# Patient Record
(demographics unavailable — no encounter records)

---

## 2024-12-30 NOTE — ASSESSMENT
[FreeTextEntry1] : 64-year-old  male presents today for initial consultation of abnormal MRI findings. Patient reports endocrinologist sent him for an US- which prompted an MRI. Patient underwent an MRI abdomen/ MRCP at [facility] on 7/8/24 results showed the following: Left adrenal gland demonstrates a 2.4 x 1.5cm nodule which demonstrates intracellular fat at inferior aspect and remainder of the nodule show no significant intracellular fat. The lesion is new since prior study. Therefore, metastasis cannot be ruled out. 3.1cm focal lesion at the right inrahilar region and 5.3 x 4cm lesion at subchondral region. Possibility of enlarged LN considered. There is also a 1.5cm density in the LLL. Further correlation with chest CT scan is rec.  Patient is known to the practice for a history of RCC in 2017- was following with Dr. Draper. S/p RIGHT radical nephrectomy 12/08/2017 for a renal cell carcinoma, clear type, 6.3 x 4.5 x 4.2 cm, occupying the midpole of the kidney. Nilson and WHO/ISUP Grade 1. Negative for lymphovascular or perineural invasion and confined within the renal capsule, even as it bulges out into the adjacent perinephric and sinus fat.  # Anemia, CKD EGFR 27  -PETCT Enlarged, hypermetabolic aortopulmonary, subcarinal, and inferior right hilar nodes, most likely representing metastatic disease. Intense focal uptake in the mid and upper sacrum, SUV max 9.3, possibly representing metastatic disease. Differential diagnosis includes malignant transformation of Paget's disease. Diffuse sclerotic changes of the right hemipelvis, most likely representing the patient's known Paget's disease.  Mildly FDG avid 2.5 cm left adrenal nodule, possibly representing metastatic disease. - biopsy 9/2024- c/w metastatic RCC - Reviewed with patient stage 4, options of treatment vs wait and watch.  Will repeat CT scan in 3 months. Patient hd interval few years between tx  - CT scan 12/2024- POD - increased adrenal mass and mediastinal adenopathy.  MRI brain - [facility] - patient offered therapy - with immunotherapy - he declines to start now.

## 2024-12-30 NOTE — HISTORY OF PRESENT ILLNESS
[Disease: _____________________] : Disease: [unfilled] [T: ___] : T[unfilled] [N: ___] : N[unfilled] [de-identified] : Patient overall feels well with no new issues. He had a CT of the chest and a MRI of the brain on Dec 16th, 2024.  [de-identified] : 64-year-old  male presents today for initial consultation of abnormal MRI findings.  Patient reports endocrinologist sent him for an US- which prompted an MRI.  Patient underwent an MRI abdomen/ MRCP at [facility] on 7/8/24 results showed the following: Left adrenal gland demonstrates a 2.4 x 1.5cm nodule which demonstrates intracellular fat at inferior aspect and remainder of the nodule show no significant intracellular fat.  The lesion is new since prior study.  Therefore, metastasis cannot be ruled out.  3.1cm focal lesion at the right inrahilar region and 5.3 x 4cm lesion at subchondral region.  Possibility of enlarged LN considered.  There is also a 1.5cm density in the LLL.  Further correlation with chest CT scan is rec.    Patient is known to the practice for a history of RCC in 2017- was following with Dr. Draper.  S/p RIGHT radical nephrectomy 12/08/2017 for a renal cell carcinoma, clear type, 6.3 x 4.5 x 4.2 cm, occupying the midpole of the kidney. Nilson and WHO/ISUP Grade 1. Negative for lymphovascular or perineural invasion and confined within the renal capsule, even as it bulges out into the adjacent perinephric and sinus fat. The tumor externally compresses but does no invade the renal pelvis and the calyces. Vascular, ureteral and soft tissue margins are all negative for tumor involvement. Benign adrenals (Tumor staging [id number] ). He was found on postoperative blood work to have persistent elevation of liver function tests (11/17 , ALT 73, 1/18  ALT 45) He was referred for MRI of abdomen which showed an abnormal bony matrix identified involving the right iliac bone extending to and involving the right acetabulum and also involving the right sacrum at the level f the right sacroiliac joint space - may represent Paget's disease. The bone scan prior to surgery showed involvement in same area.  He is s/p one dose of zometa to stabilize iliac bone and acetabulum.    He reports he has lost weight beginning 1 year ago, intentional.  Recently he started to have mid back pain- however he attributed that to exercising.  Denies cough or SOB.    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

## 2024-12-30 NOTE — HISTORY OF PRESENT ILLNESS
[Disease: _____________________] : Disease: [unfilled] [T: ___] : T[unfilled] [N: ___] : N[unfilled] [de-identified] : 64-year-old  male presents today for initial consultation of abnormal MRI findings.  Patient reports endocrinologist sent him for an US- which prompted an MRI.  Patient underwent an MRI abdomen/ MRCP at [facility] on 7/8/24 results showed the following: Left adrenal gland demonstrates a 2.4 x 1.5cm nodule which demonstrates intracellular fat at inferior aspect and remainder of the nodule show no significant intracellular fat.  The lesion is new since prior study.  Therefore, metastasis cannot be ruled out.  3.1cm focal lesion at the right inrahilar region and 5.3 x 4cm lesion at subchondral region.  Possibility of enlarged LN considered.  There is also a 1.5cm density in the LLL.  Further correlation with chest CT scan is rec.    Patient is known to the practice for a history of RCC in 2017- was following with Dr. Draper.  S/p RIGHT radical nephrectomy 12/08/2017 for a renal cell carcinoma, clear type, 6.3 x 4.5 x 4.2 cm, occupying the midpole of the kidney. Nilson and WHO/ISUP Grade 1. Negative for lymphovascular or perineural invasion and confined within the renal capsule, even as it bulges out into the adjacent perinephric and sinus fat. The tumor externally compresses but does no invade the renal pelvis and the calyces. Vascular, ureteral and soft tissue margins are all negative for tumor involvement. Benign adrenals (Tumor staging [id number] ). He was found on postoperative blood work to have persistent elevation of liver function tests (11/17 , ALT 73, 1/18  ALT 45) He was referred for MRI of abdomen which showed an abnormal bony matrix identified involving the right iliac bone extending to and involving the right acetabulum and also involving the right sacrum at the level f the right sacroiliac joint space - may represent Paget's disease. The bone scan prior to surgery showed involvement in same area.  He is s/p one dose of zometa to stabilize iliac bone and acetabulum.    He reports he has lost weight beginning 1 year ago, intentional.  Recently he started to have mid back pain- however he attributed that to exercising.  Denies cough or SOB.    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 He had a CT of the chest and a MRI of the brain on Dec 16th, 2024.

## 2024-12-30 NOTE — ASSESSMENT
[FreeTextEntry1] : 64-year-old  male presents today for initial consultation of abnormal MRI findings. Patient reports endocrinologist sent him for an US- which prompted an MRI. Patient underwent an MRI abdomen/ MRCP at [facility] on 7/8/24 results showed the following: Left adrenal gland demonstrates a 2.4 x 1.5cm nodule which demonstrates intracellular fat at inferior aspect and remainder of the nodule show no significant intracellular fat. The lesion is new since prior study. Therefore, metastasis cannot be ruled out. 3.1cm focal lesion at the right inrahilar region and 5.3 x 4cm lesion at subchondral region. Possibility of enlarged LN considered. There is also a 1.5cm density in the LLL. Further correlation with chest CT scan is rec.  Patient is known to the practice for a history of RCC in 2017- was following with Dr. Draper. S/p RIGHT radical nephrectomy 12/08/2017 for a renal cell carcinoma, clear type, 6.3 x 4.5 x 4.2 cm, occupying the midpole of the kidney. Nilson and WHO/ISUP Grade 1. Negative for lymphovascular or perineural invasion and confined within the renal capsule, even as it bulges out into the adjacent perinephric and sinus fat.  # Anemia, CKD EGFR 27  -PETCT Enlarged, hypermetabolic aortopulmonary, subcarinal, and inferior right hilar nodes, most likely representing metastatic disease. Intense focal uptake in the mid and upper sacrum, SUV max 9.3, possibly representing metastatic disease. Differential diagnosis includes malignant transformation of Paget's disease. Diffuse sclerotic changes of the right hemipelvis, most likely representing the patient's known Paget's disease.  Mildly FDG avid 2.5 cm left adrenal nodule, possibly representing metastatic disease. - biopsy 9/2024- c/w metastatic RCC - Reviewed with patient stage 4, options of treatment vs wait and watch.  Will repeat CT scan in 3 months. Patient hd interval few years between tx  - CT scan 12/2024- POD - increased adrenal mass and mediastinal adenopathy.  MRI brain - [facility] - patient offered therapy - with immunotherapy - he declines to start now.

## 2024-12-30 NOTE — HISTORY OF PRESENT ILLNESS
[Disease: _____________________] : Disease: [unfilled] [T: ___] : T[unfilled] [N: ___] : N[unfilled] [de-identified] : 64-year-old  male presents today for initial consultation of abnormal MRI findings.  Patient reports endocrinologist sent him for an US- which prompted an MRI.  Patient underwent an MRI abdomen/ MRCP at [facility] on 7/8/24 results showed the following: Left adrenal gland demonstrates a 2.4 x 1.5cm nodule which demonstrates intracellular fat at inferior aspect and remainder of the nodule show no significant intracellular fat.  The lesion is new since prior study.  Therefore, metastasis cannot be ruled out.  3.1cm focal lesion at the right inrahilar region and 5.3 x 4cm lesion at subchondral region.  Possibility of enlarged LN considered.  There is also a 1.5cm density in the LLL.  Further correlation with chest CT scan is rec.    Patient is known to the practice for a history of RCC in 2017- was following with Dr. Draper.  S/p RIGHT radical nephrectomy 12/08/2017 for a renal cell carcinoma, clear type, 6.3 x 4.5 x 4.2 cm, occupying the midpole of the kidney. Nilson and WHO/ISUP Grade 1. Negative for lymphovascular or perineural invasion and confined within the renal capsule, even as it bulges out into the adjacent perinephric and sinus fat. The tumor externally compresses but does no invade the renal pelvis and the calyces. Vascular, ureteral and soft tissue margins are all negative for tumor involvement. Benign adrenals (Tumor staging [id number] ). He was found on postoperative blood work to have persistent elevation of liver function tests (11/17 , ALT 73, 1/18  ALT 45) He was referred for MRI of abdomen which showed an abnormal bony matrix identified involving the right iliac bone extending to and involving the right acetabulum and also involving the right sacrum at the level f the right sacroiliac joint space - may represent Paget's disease. The bone scan prior to surgery showed involvement in same area.  He is s/p one dose of zometa to stabilize iliac bone and acetabulum.    He reports he has lost weight beginning 1 year ago, intentional.  Recently he started to have mid back pain- however he attributed that to exercising.  Denies cough or SOB.    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 He had a CT of the chest and a MRI of the brain on Dec 16th, 2024.

## 2024-12-30 NOTE — ASSESSMENT
[FreeTextEntry1] : 64-year-old  male presents today for initial consultation of abnormal MRI findings. Patient reports endocrinologist sent him for an US- which prompted an MRI. Patient underwent an MRI abdomen/ MRCP at [facility] on 7/8/24 results showed the following: Left adrenal gland demonstrates a 2.4 x 1.5cm nodule which demonstrates intracellular fat at inferior aspect and remainder of the nodule show no significant intracellular fat. The lesion is new since prior study. Therefore, metastasis cannot be ruled out. 3.1cm focal lesion at the right inrahilar region and 5.3 x 4cm lesion at subchondral region. Possibility of enlarged LN considered. There is also a 1.5cm density in the LLL. Further correlation with chest CT scan is rec.  Patient is known to the practice for a history of RCC in 2017- was following with Dr. Draper. S/p RIGHT radical nephrectomy 12/08/2017 for a renal cell carcinoma, clear type, 6.3 x 4.5 x 4.2 cm, occupying the midpole of the kidney. Nilson and WHO/ISUP Grade 1. Negative for lymphovascular or perineural invasion and confined within the renal capsule, even as it bulges out into the adjacent perinephric and sinus fat.  # Anemia, CKD EGFR 27  -PETCT Enlarged, hypermetabolic aortopulmonary, subcarinal, and inferior right hilar nodes, most likely representing metastatic disease. Intense focal uptake in the mid and upper sacrum, SUV max 9.3, possibly representing metastatic disease. Differential diagnosis includes malignant transformation of Paget's disease. Diffuse sclerotic changes of the right hemipelvis, most likely representing the patient's known Paget's disease.  Mildly FDG avid 2.5 cm left adrenal nodule, possibly representing metastatic disease. - biopsy 9/2024- c/w metastatic RCC - Reviewed with patient stage 4, options of treatment vs wait and watch.  Will repeat CT scan in 3 months. Patient hd interval few years between tx  - CT scan 12/2024- POD - increased adrenal mass and mediastinal adenopathy.  MRI brain - [facility] - patient offered therapy - with immunotherapy - he declines to start now.

## 2025-03-17 NOTE — HISTORY OF PRESENT ILLNESS
[Disease: _____________________] : Disease: [unfilled] [T: ___] : T[unfilled] [N: ___] : N[unfilled] [de-identified] : 64-year-old  male presents today for initial consultation of abnormal MRI findings.  Patient reports endocrinologist sent him for an US- which prompted an MRI.  Patient underwent an MRI abdomen/ MRCP at [facility] on 7/8/24 results showed the following: Left adrenal gland demonstrates a 2.4 x 1.5cm nodule which demonstrates intracellular fat at inferior aspect and remainder of the nodule show no significant intracellular fat.  The lesion is new since prior study.  Therefore, metastasis cannot be ruled out.  3.1cm focal lesion at the right inrahilar region and 5.3 x 4cm lesion at subchondral region.  Possibility of enlarged LN considered.  There is also a 1.5cm density in the LLL.  Further correlation with chest CT scan is rec.    Patient is known to the practice for a history of RCC in 2017- was following with Dr. Draper.  S/p RIGHT radical nephrectomy 12/08/2017 for a renal cell carcinoma, clear type, 6.3 x 4.5 x 4.2 cm, occupying the midpole of the kidney. Nilson and WHO/ISUP Grade 1. Negative for lymphovascular or perineural invasion and confined within the renal capsule, even as it bulges out into the adjacent perinephric and sinus fat. The tumor externally compresses but does no invade the renal pelvis and the calyces. Vascular, ureteral and soft tissue margins are all negative for tumor involvement. Benign adrenals (Tumor staging [id number] ). He was found on postoperative blood work to have persistent elevation of liver function tests (11/17 , ALT 73, 1/18  ALT 45) He was referred for MRI of abdomen which showed an abnormal bony matrix identified involving the right iliac bone extending to and involving the right acetabulum and also involving the right sacrum at the level f the right sacroiliac joint space - may represent Paget's disease. The bone scan prior to surgery showed involvement in same area.  He is s/p one dose of zometa to stabilize iliac bone and acetabulum.    He reports he has lost weight beginning 1 year ago, intentional.  Recently he started to have mid back pain- however he attributed that to exercising.  Denies cough or SOB.    Family Hx- Father with liver cancer (ETOH use) in his 60's  Social Hx- Former cigar use social occasions Denies ETOH or illicit drug use Retired [employer]- working PT [de-identified] : Patient presents today for follow up. About a month ago patient repots feeling cold and increased fatigue and weakness. If he is standing for too long he feels very weak and needs to take a break and sit down.   CT 03/2025 MRI 12/2024

## 2025-03-17 NOTE — HISTORY OF PRESENT ILLNESS
[Disease: _____________________] : Disease: [unfilled] [T: ___] : T[unfilled] [N: ___] : N[unfilled] [de-identified] : 64-year-old  male presents today for initial consultation of abnormal MRI findings.  Patient reports endocrinologist sent him for an US- which prompted an MRI.  Patient underwent an MRI abdomen/ MRCP at [facility] on 7/8/24 results showed the following: Left adrenal gland demonstrates a 2.4 x 1.5cm nodule which demonstrates intracellular fat at inferior aspect and remainder of the nodule show no significant intracellular fat.  The lesion is new since prior study.  Therefore, metastasis cannot be ruled out.  3.1cm focal lesion at the right inrahilar region and 5.3 x 4cm lesion at subchondral region.  Possibility of enlarged LN considered.  There is also a 1.5cm density in the LLL.  Further correlation with chest CT scan is rec.    Patient is known to the practice for a history of RCC in 2017- was following with Dr. Draper.  S/p RIGHT radical nephrectomy 12/08/2017 for a renal cell carcinoma, clear type, 6.3 x 4.5 x 4.2 cm, occupying the midpole of the kidney. Nilson and WHO/ISUP Grade 1. Negative for lymphovascular or perineural invasion and confined within the renal capsule, even as it bulges out into the adjacent perinephric and sinus fat. The tumor externally compresses but does no invade the renal pelvis and the calyces. Vascular, ureteral and soft tissue margins are all negative for tumor involvement. Benign adrenals (Tumor staging [id number] ). He was found on postoperative blood work to have persistent elevation of liver function tests (11/17 , ALT 73, 1/18  ALT 45) He was referred for MRI of abdomen which showed an abnormal bony matrix identified involving the right iliac bone extending to and involving the right acetabulum and also involving the right sacrum at the level f the right sacroiliac joint space - may represent Paget's disease. The bone scan prior to surgery showed involvement in same area.  He is s/p one dose of zometa to stabilize iliac bone and acetabulum.    He reports he has lost weight beginning 1 year ago, intentional.  Recently he started to have mid back pain- however he attributed that to exercising.  Denies cough or SOB.    Family Hx- Father with liver cancer (ETOH use) in his 60's  Social Hx- Former cigar use social occasions Denies ETOH or illicit drug use Retired [employer]- working PT [de-identified] : Patient presents today for follow up. About a month ago patient repots feeling cold and increased fatigue and weakness. If he is standing for too long he feels very weak and needs to take a break and sit down.   CT 03/2025 MRI 12/2024

## 2025-03-17 NOTE — BEGINNING OF VISIT
[0] : 2) Feeling down, depressed, or hopeless: Not at all (0) [PHQ-2 Negative] : PHQ-2 Negative [Pain Scale: ___] : On a scale of 1-10, today the patient's pain is a(n) [unfilled]. [Former] : Former [> 15 Years] : > 15 Years [Date Discussed (MM/DD/YY): ___] : Discussed: [unfilled] [Reviewed, no changes] : Reviewed, no changes [[id number]] : 0 [de-identified] : quit over 40 years ago

## 2025-03-17 NOTE — ASSESSMENT
[FreeTextEntry1] : 64-year-old  male presents today for initial consultation of abnormal MRI findings. Patient reports endocrinologist sent him for an US- which prompted an MRI. Patient underwent an MRI abdomen/ MRCP at [facility] on 7/8/24 results showed the following: Left adrenal gland demonstrates a 2.4 x 1.5cm nodule which demonstrates intracellular fat at inferior aspect and remainder of the nodule show no significant intracellular fat. The lesion is new since prior study. Therefore, metastasis cannot be ruled out. 3.1cm focal lesion at the right inrahilar region and 5.3 x 4cm lesion at subchondral region. Possibility of enlarged LN considered. There is also a 1.5cm density in the LLL. Further correlation with chest CT scan is rec.  Patient is known to the practice for a history of RCC in 2017- was following with Dr. Draper. S/p RIGHT radical nephrectomy 12/08/2017 for a renal cell carcinoma, clear type, 6.3 x 4.5 x 4.2 cm, occupying the midpole of the kidney. Nilson and WHO/ISUP Grade 1. Negative for lymphovascular or perineural invasion and confined within the renal capsule, even as it bulges out into the adjacent perinephric and sinus fat.  # Anemia, CKD EGFR 27  -PETCT Enlarged, hypermetabolic aortopulmonary, subcarinal, and inferior right hilar nodes, most likely representing metastatic disease. Intense focal uptake in the mid and upper sacrum, SUV max 9.3, possibly representing metastatic disease. Differential diagnosis includes malignant transformation of Paget's disease. Diffuse sclerotic changes of the right hemipelvis, most likely representing the patient's known Paget's disease.  Mildly FDG avid 2.5 cm left adrenal nodule, possibly representing metastatic disease. - biopsy 9/2024- c/w metastatic RCC - Reviewed with patient stage 4, options of treatment vs wait and watch.  Will repeat CT scan in 3 months. Patient hd interval few years between tx  - CT scan 12/2024- POD - increased adrenal mass and mediastinal adenopathy.  MRI brain - [facility] - patient offered therapy - with immunotherapy - he declines to start now.  - CT scan 3/25- stable adenopathy, images reviewed with patient  - PETCT 6/25  # Hypotension - patient on Monjaro - lost 13 kg, decline in BP while on Losartan.  Stop losartan, IV fluids, check cortisol

## 2025-03-17 NOTE — REVIEW OF SYSTEMS
[Diarrhea: Grade 0] : Diarrhea: Grade 0 [Negative] : Allergic/Immunologic [Patient Intake Form Reviewed] : Patient intake form was reviewed [Fatigue] : fatigue

## 2025-03-17 NOTE — BEGINNING OF VISIT
[0] : 2) Feeling down, depressed, or hopeless: Not at all (0) [PHQ-2 Negative] : PHQ-2 Negative [Pain Scale: ___] : On a scale of 1-10, today the patient's pain is a(n) [unfilled]. [Former] : Former [> 15 Years] : > 15 Years [Date Discussed (MM/DD/YY): ___] : Discussed: [unfilled] [Reviewed, no changes] : Reviewed, no changes [[id number]] : 0 [de-identified] : quit over 40 years ago

## 2025-03-17 NOTE — HISTORY OF PRESENT ILLNESS
[Disease: _____________________] : Disease: [unfilled] [T: ___] : T[unfilled] [N: ___] : N[unfilled] [de-identified] : 64-year-old  male presents today for initial consultation of abnormal MRI findings.  Patient reports endocrinologist sent him for an US- which prompted an MRI.  Patient underwent an MRI abdomen/ MRCP at [facility] on 7/8/24 results showed the following: Left adrenal gland demonstrates a 2.4 x 1.5cm nodule which demonstrates intracellular fat at inferior aspect and remainder of the nodule show no significant intracellular fat.  The lesion is new since prior study.  Therefore, metastasis cannot be ruled out.  3.1cm focal lesion at the right inrahilar region and 5.3 x 4cm lesion at subchondral region.  Possibility of enlarged LN considered.  There is also a 1.5cm density in the LLL.  Further correlation with chest CT scan is rec.    Patient is known to the practice for a history of RCC in 2017- was following with Dr. Draper.  S/p RIGHT radical nephrectomy 12/08/2017 for a renal cell carcinoma, clear type, 6.3 x 4.5 x 4.2 cm, occupying the midpole of the kidney. Nilson and WHO/ISUP Grade 1. Negative for lymphovascular or perineural invasion and confined within the renal capsule, even as it bulges out into the adjacent perinephric and sinus fat. The tumor externally compresses but does no invade the renal pelvis and the calyces. Vascular, ureteral and soft tissue margins are all negative for tumor involvement. Benign adrenals (Tumor staging [id number] ). He was found on postoperative blood work to have persistent elevation of liver function tests (11/17 , ALT 73, 1/18  ALT 45) He was referred for MRI of abdomen which showed an abnormal bony matrix identified involving the right iliac bone extending to and involving the right acetabulum and also involving the right sacrum at the level f the right sacroiliac joint space - may represent Paget's disease. The bone scan prior to surgery showed involvement in same area.  He is s/p one dose of zometa to stabilize iliac bone and acetabulum.    He reports he has lost weight beginning 1 year ago, intentional.  Recently he started to have mid back pain- however he attributed that to exercising.  Denies cough or SOB.    Family Hx- Father with liver cancer (ETOH use) in his 60's  Social Hx- Former cigar use social occasions Denies ETOH or illicit drug use Retired [employer]- working PT [de-identified] : Patient presents today for follow up. About a month ago patient repots feeling cold and increased fatigue and weakness. If he is standing for too long he feels very weak and needs to take a break and sit down.   CT 03/2025 MRI 12/2024

## 2025-03-17 NOTE — BEGINNING OF VISIT
[0] : 2) Feeling down, depressed, or hopeless: Not at all (0) [PHQ-2 Negative] : PHQ-2 Negative [Pain Scale: ___] : On a scale of 1-10, today the patient's pain is a(n) [unfilled]. [Former] : Former [> 15 Years] : > 15 Years [Date Discussed (MM/DD/YY): ___] : Discussed: [unfilled] [Reviewed, no changes] : Reviewed, no changes [[id number]] : 0 [de-identified] : quit over 40 years ago

## 2025-03-17 NOTE — HISTORY OF PRESENT ILLNESS
[Disease: _____________________] : Disease: [unfilled] [T: ___] : T[unfilled] [N: ___] : N[unfilled] [de-identified] : 64-year-old  male presents today for initial consultation of abnormal MRI findings.  Patient reports endocrinologist sent him for an US- which prompted an MRI.  Patient underwent an MRI abdomen/ MRCP at [facility] on 7/8/24 results showed the following: Left adrenal gland demonstrates a 2.4 x 1.5cm nodule which demonstrates intracellular fat at inferior aspect and remainder of the nodule show no significant intracellular fat.  The lesion is new since prior study.  Therefore, metastasis cannot be ruled out.  3.1cm focal lesion at the right inrahilar region and 5.3 x 4cm lesion at subchondral region.  Possibility of enlarged LN considered.  There is also a 1.5cm density in the LLL.  Further correlation with chest CT scan is rec.    Patient is known to the practice for a history of RCC in 2017- was following with Dr. Draper.  S/p RIGHT radical nephrectomy 12/08/2017 for a renal cell carcinoma, clear type, 6.3 x 4.5 x 4.2 cm, occupying the midpole of the kidney. Nilson and WHO/ISUP Grade 1. Negative for lymphovascular or perineural invasion and confined within the renal capsule, even as it bulges out into the adjacent perinephric and sinus fat. The tumor externally compresses but does no invade the renal pelvis and the calyces. Vascular, ureteral and soft tissue margins are all negative for tumor involvement. Benign adrenals (Tumor staging [id number] ). He was found on postoperative blood work to have persistent elevation of liver function tests (11/17 , ALT 73, 1/18  ALT 45) He was referred for MRI of abdomen which showed an abnormal bony matrix identified involving the right iliac bone extending to and involving the right acetabulum and also involving the right sacrum at the level f the right sacroiliac joint space - may represent Paget's disease. The bone scan prior to surgery showed involvement in same area.  He is s/p one dose of zometa to stabilize iliac bone and acetabulum.    He reports he has lost weight beginning 1 year ago, intentional.  Recently he started to have mid back pain- however he attributed that to exercising.  Denies cough or SOB.    Family Hx- Father with liver cancer (ETOH use) in his 60's  Social Hx- Former cigar use social occasions Denies ETOH or illicit drug use Retired [employer]- working PT [de-identified] : Patient presents today for follow up. About a month ago patient repots feeling cold and increased fatigue and weakness. If he is standing for too long he feels very weak and needs to take a break and sit down.   CT 03/2025 MRI 12/2024

## 2025-03-17 NOTE — REASON FOR VISIT
No significant past surgical history [Follow-Up Visit] : a follow-up [Spouse] : spouse [FreeTextEntry2] : adenopathy

## 2025-03-17 NOTE — BEGINNING OF VISIT
[0] : 2) Feeling down, depressed, or hopeless: Not at all (0) [PHQ-2 Negative] : PHQ-2 Negative [Pain Scale: ___] : On a scale of 1-10, today the patient's pain is a(n) [unfilled]. [Former] : Former [> 15 Years] : > 15 Years [Date Discussed (MM/DD/YY): ___] : Discussed: [unfilled] [Reviewed, no changes] : Reviewed, no changes [[id number]] : 0 [de-identified] : quit over 40 years ago

## 2025-03-17 NOTE — ASSESSMENT
[FreeTextEntry1] : 64-year-old  male presents today for initial consultation of abnormal MRI findings. Patient reports endocrinologist sent him for an US- which prompted an MRI. Patient underwent an MRI abdomen/ MRCP at [facility] on 7/8/24 results showed the following: Left adrenal gland demonstrates a 2.4 x 1.5cm nodule which demonstrates intracellular fat at inferior aspect and remainder of the nodule show no significant intracellular fat. The lesion is new since prior study. Therefore, metastasis cannot be ruled out. 3.1cm focal lesion at the right inrahilar region and 5.3 x 4cm lesion at subchondral region. Possibility of enlarged LN considered. There is also a 1.5cm density in the LLL. Further correlation with chest CT scan is rec.  Patient is known to the practice for a history of RCC in 2017- was following with Dr. Draper. S/p RIGHT radical nephrectomy 12/08/2017 for a renal cell carcinoma, clear type, 6.3 x 4.5 x 4.2 cm, occupying the midpole of the kidney. Nilson and WHO/ISUP Grade 1. Negative for lymphovascular or perineural invasion and confined within the renal capsule, even as it bulges out into the adjacent perinephric and sinus fat.  # Anemia, CKD EGFR 27  -PETCT Enlarged, hypermetabolic aortopulmonary, subcarinal, and inferior right hilar nodes, most likely representing metastatic disease. Intense focal uptake in the mid and upper sacrum, SUV max 9.3, possibly representing metastatic disease. Differential diagnosis includes malignant transformation of Paget's disease. Diffuse sclerotic changes of the right hemipelvis, most likely representing the patient's known Paget's disease.  Mildly FDG avid 2.5 cm left adrenal nodule, possibly representing metastatic disease. - biopsy 9/2024- c/w metastatic RCC - Reviewed with patient stage 4, options of treatment vs wait and watch.  Will repeat CT scan in 3 months. Patient hd interval few years between tx  - CT scan 12/2024- POD - increased adrenal mass and mediastinal adenopathy.  MRI brain - [facility] - patient offered therapy - with immunotherapy - he declines to start now.  - CT scan 3/25- stable adenopathy, images reviewed with patient  - PETCT 6/25  # Hypotension - patient on Monjaro - lost 13 kg, decline in BP while on Losartan.  Stop losartan, IV fluids, check cortisol

## 2025-03-17 NOTE — ASSESSMENT
[FreeTextEntry1] : 64-year-old  male presents today for initial consultation of abnormal MRI findings. Patient reports endocrinologist sent him for an US- which prompted an MRI. Patient underwent an MRI abdomen/ MRCP at [facility] on 7/8/24 results showed the following: Left adrenal gland demonstrates a 2.4 x 1.5cm nodule which demonstrates intracellular fat at inferior aspect and remainder of the nodule show no significant intracellular fat. The lesion is new since prior study. Therefore, metastasis cannot be ruled out. 3.1cm focal lesion at the right inrahilar region and 5.3 x 4cm lesion at subchondral region. Possibility of enlarged LN considered. There is also a 1.5cm density in the LLL. Further correlation with chest CT scan is rec.  Patient is known to the practice for a history of RCC in 2017- was following with Dr. Draper. S/p RIGHT radical nephrectomy 12/08/2017 for a renal cell carcinoma, clear type, 6.3 x 4.5 x 4.2 cm, occupying the midpole of the kidney. Nilson and WHO/ISUP Grade 1. Negative for lymphovascular or perineural invasion and confined within the renal capsule, even as it bulges out into the adjacent perinephric and sinus fat.  # Anemia, CKD EGFR 27  -PETCT Enlarged, hypermetabolic aortopulmonary, subcarinal, and inferior right hilar nodes, most likely representing metastatic disease. Intense focal uptake in the mid and upper sacrum, SUV max 9.3, possibly representing metastatic disease. Differential diagnosis includes malignant transformation of Paget's disease. Diffuse sclerotic changes of the right hemipelvis, most likely representing the patient's known Paget's disease.  Mildly FDG avid 2.5 cm left adrenal nodule, possibly representing metastatic disease. - biopsy 9/2024- c/w metastatic RCC - Reviewed with patient stage 4, options of treatment vs wait and watch.  Will repeat CT scan in 3 months. Patient hd interval few years between tx  - CT scan 12/2024- POD - increased adrenal mass and mediastinal adenopathy.  MRI brain - [facility] - patient offered therapy - with immunotherapy - he declines to start now.  - CT scan 3/25- stable adenopathy, images reviewed with patient  - PETCT 6/25  # Hypotension - patient on Monjaro - lost 13 kg, decline in BP while on Losartan.  Stop losartan, IV fluids, check cortisol

## 2025-03-17 NOTE — BEGINNING OF VISIT
[0] : 2) Feeling down, depressed, or hopeless: Not at all (0) [PHQ-2 Negative] : PHQ-2 Negative [Pain Scale: ___] : On a scale of 1-10, today the patient's pain is a(n) [unfilled]. [Former] : Former [> 15 Years] : > 15 Years [Date Discussed (MM/DD/YY): ___] : Discussed: [unfilled] [Reviewed, no changes] : Reviewed, no changes [AAC4Hphib] : 0 [de-identified] : quit over 40 years ago

## 2025-03-17 NOTE — HISTORY OF PRESENT ILLNESS
[Disease: _____________________] : Disease: [unfilled] [T: ___] : T[unfilled] [N: ___] : N[unfilled] [de-identified] : 64-year-old  male presents today for initial consultation of abnormal MRI findings.  Patient reports endocrinologist sent him for an US- which prompted an MRI.  Patient underwent an MRI abdomen/ MRCP at [facility] on 7/8/24 results showed the following: Left adrenal gland demonstrates a 2.4 x 1.5cm nodule which demonstrates intracellular fat at inferior aspect and remainder of the nodule show no significant intracellular fat.  The lesion is new since prior study.  Therefore, metastasis cannot be ruled out.  3.1cm focal lesion at the right inrahilar region and 5.3 x 4cm lesion at subchondral region.  Possibility of enlarged LN considered.  There is also a 1.5cm density in the LLL.  Further correlation with chest CT scan is rec.    Patient is known to the practice for a history of RCC in 2017- was following with Dr. Draper.  S/p RIGHT radical nephrectomy 12/08/2017 for a renal cell carcinoma, clear type, 6.3 x 4.5 x 4.2 cm, occupying the midpole of the kidney. Nilson and WHO/ISUP Grade 1. Negative for lymphovascular or perineural invasion and confined within the renal capsule, even as it bulges out into the adjacent perinephric and sinus fat. The tumor externally compresses but does no invade the renal pelvis and the calyces. Vascular, ureteral and soft tissue margins are all negative for tumor involvement. Benign adrenals (Tumor staging [id number] ). He was found on postoperative blood work to have persistent elevation of liver function tests (11/17 , ALT 73, 1/18  ALT 45) He was referred for MRI of abdomen which showed an abnormal bony matrix identified involving the right iliac bone extending to and involving the right acetabulum and also involving the right sacrum at the level f the right sacroiliac joint space - may represent Paget's disease. The bone scan prior to surgery showed involvement in same area.  He is s/p one dose of zometa to stabilize iliac bone and acetabulum.    He reports he has lost weight beginning 1 year ago, intentional.  Recently he started to have mid back pain- however he attributed that to exercising.  Denies cough or SOB.    Family Hx- Father with liver cancer (ETOH use) in his 60's  Social Hx- Former cigar use social occasions Denies ETOH or illicit drug use Retired [employer]- working PT [de-identified] : Patient presents today for follow up. About a month ago patient repots feeling cold and increased fatigue and weakness. If he is standing for too long he feels very weak and needs to take a break and sit down.   CT 03/2025 MRI 12/2024

## 2025-03-17 NOTE — ASSESSMENT
[FreeTextEntry1] : 64-year-old  male presents today for initial consultation of abnormal MRI findings. Patient reports endocrinologist sent him for an US- which prompted an MRI. Patient underwent an MRI abdomen/ MRCP at [facility] on 7/8/24 results showed the following: Left adrenal gland demonstrates a 2.4 x 1.5cm nodule which demonstrates intracellular fat at inferior aspect and remainder of the nodule show no significant intracellular fat. The lesion is new since prior study. Therefore, metastasis cannot be ruled out. 3.1cm focal lesion at the right inrahilar region and 5.3 x 4cm lesion at subchondral region. Possibility of enlarged LN considered. There is also a 1.5cm density in the LLL. Further correlation with chest CT scan is rec.  Patient is known to the practice for a history of RCC in 2017- was following with Dr. Draper. S/p RIGHT radical nephrectomy 12/08/2017 for a renal cell carcinoma, clear type, 6.3 x 4.5 x 4.2 cm, occupying the midpole of the kidney. Nilson and WHO/ISUP Grade 1. Negative for lymphovascular or perineural invasion and confined within the renal capsule, even as it bulges out into the adjacent perinephric and sinus fat.  # Anemia, CKD EGFR 27  -PETCT Enlarged, hypermetabolic aortopulmonary, subcarinal, and inferior right hilar nodes, most likely representing metastatic disease. Intense focal uptake in the mid and upper sacrum, SUV max 9.3, possibly representing metastatic disease. Differential diagnosis includes malignant transformation of Paget's disease. Diffuse sclerotic changes of the right hemipelvis, most likely representing the patient's known Paget's disease.  Mildly FDG avid 2.5 cm left adrenal nodule, possibly representing metastatic disease. - biopsy 9/2024- c/w metastatic RCC - Reviewed with patient stage 4, options of treatment vs wait and watch.  Will repeat CT scan in 3 months. Patient hd interval few years between tx  - CT scan 12/2024- POD - increased adrenal mass and mediastinal adenopathy.  MRI brain - [facility] - patient offered therapy - with immunotherapy - he declines to start now.  - CT scan 3/25- stable adenopathy, images reviewed with patient  - PETCT 6/25  # Hypotension - patient on Monjaro - lost 13 kg, decline in BP while on Losartan.  Stop losartan, IV fluids, check cortisol

## 2025-03-17 NOTE — ASSESSMENT
[FreeTextEntry1] : 64-year-old  male presents today for initial consultation of abnormal MRI findings. Patient reports endocrinologist sent him for an US- which prompted an MRI. Patient underwent an MRI abdomen/ MRCP at [facility] on 7/8/24 results showed the following: Left adrenal gland demonstrates a 2.4 x 1.5cm nodule which demonstrates intracellular fat at inferior aspect and remainder of the nodule show no significant intracellular fat. The lesion is new since prior study. Therefore, metastasis cannot be ruled out. 3.1cm focal lesion at the right inrahilar region and 5.3 x 4cm lesion at subchondral region. Possibility of enlarged LN considered. There is also a 1.5cm density in the LLL. Further correlation with chest CT scan is rec.  Patient is known to the practice for a history of RCC in 2017- was following with Dr. Draper. S/p RIGHT radical nephrectomy 12/08/2017 for a renal cell carcinoma, clear type, 6.3 x 4.5 x 4.2 cm, occupying the midpole of the kidney. Nilson and WHO/ISUP Grade 1. Negative for lymphovascular or perineural invasion and confined within the renal capsule, even as it bulges out into the adjacent perinephric and sinus fat.  # Anemia, CKD EGFR 27  -PETCT Enlarged, hypermetabolic aortopulmonary, subcarinal, and inferior right hilar nodes, most likely representing metastatic disease. Intense focal uptake in the mid and upper sacrum, SUV max 9.3, possibly representing metastatic disease. Differential diagnosis includes malignant transformation of Paget's disease. Diffuse sclerotic changes of the right hemipelvis, most likely representing the patient's known Paget's disease.  Mildly FDG avid 2.5 cm left adrenal nodule, possibly representing metastatic disease. - biopsy 9/2024- c/w metastatic RCC - Reviewed with patient stage 4, options of treatment vs wait and watch.  Will repeat CT scan in 3 months. Patient hd interval few years between tx  - CT scan 12/2024- POD - increased adrenal mass and mediastinal adenopathy.  MRI brain - [facility] - patient offered therapy - with immunotherapy - he declines to start now.  - CT scan 3/25- stable adenopathy, images reviewed with patient  - PETCT 6/25  # Hypotension - patient on Monjaro - lost 13 kg, decline in BP while on Losartan.  Stop losartan, IV fluids, check cortisol

## 2025-06-19 NOTE — HISTORY OF PRESENT ILLNESS
[Disease: _____________________] : Disease: [unfilled] [T: ___] : T[unfilled] [N: ___] : N[unfilled] [de-identified] : 64-year-old  male presents today for initial consultation of abnormal MRI findings.  Patient reports endocrinologist sent him for an US- which prompted an MRI.  Patient underwent an MRI abdomen/ MRCP at [facility] on 7/8/24 results showed the following: Left adrenal gland demonstrates a 2.4 x 1.5cm nodule which demonstrates intracellular fat at inferior aspect and remainder of the nodule show no significant intracellular fat.  The lesion is new since prior study.  Therefore, metastasis cannot be ruled out.  3.1cm focal lesion at the right inrahilar region and 5.3 x 4cm lesion at subchondral region.  Possibility of enlarged LN considered.  There is also a 1.5cm density in the LLL.  Further correlation with chest CT scan is rec.    Patient is known to the practice for a history of RCC in 2017- was following with Dr. Draper.  S/p RIGHT radical nephrectomy 12/08/2017 for a renal cell carcinoma, clear type, 6.3 x 4.5 x 4.2 cm, occupying the midpole of the kidney. Nilson and WHO/ISUP Grade 1. Negative for lymphovascular or perineural invasion and confined within the renal capsule, even as it bulges out into the adjacent perinephric and sinus fat. The tumor externally compresses but does no invade the renal pelvis and the calyces. Vascular, ureteral and soft tissue margins are all negative for tumor involvement. Benign adrenals (Tumor staging [id number] ). He was found on postoperative blood work to have persistent elevation of liver function tests (11/17 , ALT 73, 1/18  ALT 45) He was referred for MRI of abdomen which showed an abnormal bony matrix identified involving the right iliac bone extending to and involving the right acetabulum and also involving the right sacrum at the level f the right sacroiliac joint space - may represent Paget's disease. The bone scan prior to surgery showed involvement in same area.  He is s/p one dose of zometa to stabilize iliac bone and acetabulum.    He reports he has lost weight beginning 1 year ago, intentional.  Recently he started to have mid back pain- however he attributed that to exercising.  Denies cough or SOB.    Family Hx- Father with liver cancer (ETOH use) in his 60's  Social Hx- Former cigar use social occasions Denies ETOH or illicit drug use Retired [employer]- working PT [de-identified] : Patient presents today for follow up. Patient overall feels well with no new issues. He has a PET scan order but hasn't heard back from radiology yet to schedule.

## 2025-06-19 NOTE — ASSESSMENT
[FreeTextEntry1] : 64-year-old  male presents today for initial consultation of abnormal MRI findings. Patient reports endocrinologist sent him for an US- which prompted an MRI. Patient underwent an MRI abdomen/ MRCP at [facility] on 7/8/24 results showed the following: Left adrenal gland demonstrates a 2.4 x 1.5cm nodule which demonstrates intracellular fat at inferior aspect and remainder of the nodule show no significant intracellular fat. The lesion is new since prior study. Therefore, metastasis cannot be ruled out. 3.1cm focal lesion at the right inrahilar region and 5.3 x 4cm lesion at subchondral region. Possibility of enlarged LN considered. There is also a 1.5cm density in the LLL. Further correlation with chest CT scan is rec.  Patient is known to the practice for a history of RCC in 2017- was following with Dr. Draper. S/p RIGHT radical nephrectomy 12/08/2017 for a renal cell carcinoma, clear type, 6.3 x 4.5 x 4.2 cm, occupying the midpole of the kidney. Nilson and WHO/ISUP Grade 1. Negative for lymphovascular or perineural invasion and confined within the renal capsule, even as it bulges out into the adjacent perinephric and sinus fat.  # Anemia, CKD EGFR 27  -PETCT Enlarged, hypermetabolic aortopulmonary, subcarinal, and inferior right hilar nodes, most likely representing metastatic disease. Intense focal uptake in the mid and upper sacrum, SUV max 9.3, possibly representing metastatic disease. Differential diagnosis includes malignant transformation of Paget's disease. Diffuse sclerotic changes of the right hemipelvis, most likely representing the patient's known Paget's disease.  Mildly FDG avid 2.5 cm left adrenal nodule, possibly representing metastatic disease. - biopsy 9/2024- c/w metastatic RCC - Reviewed with patient stage 4, options of treatment vs wait and watch.  Will repeat CT scan in 3 months. Patient hd interval few years between tx  - CT scan 12/2024- POD - increased adrenal mass and mediastinal adenopathy.  MRI brain - [facility] - patient offered therapy - with immunotherapy - he declines to start now.  - CT scan 3/25- stable adenopathy, images reviewed with patient  - PETCT 6/25- due, patient didnt do yet   # Hypotension - patient on Monjaro - lost 13 kg, decline in BP while on Losartan.  Stop losartan, IV fluids, check cortisol  # Weight lost - reviewed with patient, considering tapering Monjaro

## 2025-06-19 NOTE — HISTORY OF PRESENT ILLNESS
[Disease: _____________________] : Disease: [unfilled] [T: ___] : T[unfilled] [N: ___] : N[unfilled] [de-identified] : 64-year-old  male presents today for initial consultation of abnormal MRI findings.  Patient reports endocrinologist sent him for an US- which prompted an MRI.  Patient underwent an MRI abdomen/ MRCP at [facility] on 7/8/24 results showed the following: Left adrenal gland demonstrates a 2.4 x 1.5cm nodule which demonstrates intracellular fat at inferior aspect and remainder of the nodule show no significant intracellular fat.  The lesion is new since prior study.  Therefore, metastasis cannot be ruled out.  3.1cm focal lesion at the right inrahilar region and 5.3 x 4cm lesion at subchondral region.  Possibility of enlarged LN considered.  There is also a 1.5cm density in the LLL.  Further correlation with chest CT scan is rec.    Patient is known to the practice for a history of RCC in 2017- was following with Dr. Draper.  S/p RIGHT radical nephrectomy 12/08/2017 for a renal cell carcinoma, clear type, 6.3 x 4.5 x 4.2 cm, occupying the midpole of the kidney. Nilson and WHO/ISUP Grade 1. Negative for lymphovascular or perineural invasion and confined within the renal capsule, even as it bulges out into the adjacent perinephric and sinus fat. The tumor externally compresses but does no invade the renal pelvis and the calyces. Vascular, ureteral and soft tissue margins are all negative for tumor involvement. Benign adrenals (Tumor staging [id number] ). He was found on postoperative blood work to have persistent elevation of liver function tests (11/17 , ALT 73, 1/18  ALT 45) He was referred for MRI of abdomen which showed an abnormal bony matrix identified involving the right iliac bone extending to and involving the right acetabulum and also involving the right sacrum at the level f the right sacroiliac joint space - may represent Paget's disease. The bone scan prior to surgery showed involvement in same area.  He is s/p one dose of zometa to stabilize iliac bone and acetabulum.    He reports he has lost weight beginning 1 year ago, intentional.  Recently he started to have mid back pain- however he attributed that to exercising.  Denies cough or SOB.    Family Hx- Father with liver cancer (ETOH use) in his 60's  Social Hx- Former cigar use social occasions Denies ETOH or illicit drug use Retired [employer]- working PT [de-identified] : Patient presents today for follow up. Patient overall feels well with no new issues. He has a PET scan order but hasn't heard back from radiology yet to schedule.

## 2025-06-19 NOTE — ASSESSMENT
[FreeTextEntry1] : 64-year-old  male presents today for initial consultation of abnormal MRI findings. Patient reports endocrinologist sent him for an US- which prompted an MRI. Patient underwent an MRI abdomen/ MRCP at [facility] on 7/8/24 results showed the following: Left adrenal gland demonstrates a 2.4 x 1.5cm nodule which demonstrates intracellular fat at inferior aspect and remainder of the nodule show no significant intracellular fat. The lesion is new since prior study. Therefore, metastasis cannot be ruled out. 3.1cm focal lesion at the right inrahilar region and 5.3 x 4cm lesion at subchondral region. Possibility of enlarged LN considered. There is also a 1.5cm density in the LLL. Further correlation with chest CT scan is rec.  Patient is known to the practice for a history of RCC in 2017- was following with Dr. Draper. S/p RIGHT radical nephrectomy 12/08/2017 for a renal cell carcinoma, clear type, 6.3 x 4.5 x 4.2 cm, occupying the midpole of the kidney. Nilson and WHO/ISUP Grade 1. Negative for lymphovascular or perineural invasion and confined within the renal capsule, even as it bulges out into the adjacent perinephric and sinus fat.  # Anemia, CKD EGFR 27  -PETCT Enlarged, hypermetabolic aortopulmonary, subcarinal, and inferior right hilar nodes, most likely representing metastatic disease. Intense focal uptake in the mid and upper sacrum, SUV max 9.3, possibly representing metastatic disease. Differential diagnosis includes malignant transformation of Paget's disease. Diffuse sclerotic changes of the right hemipelvis, most likely representing the patient's known Paget's disease.  Mildly FDG avid 2.5 cm left adrenal nodule, possibly representing metastatic disease. - biopsy 9/2024- c/w metastatic RCC - Reviewed with patient stage 4, options of treatment vs wait and watch.  Will repeat CT scan in 3 months. Patient hd interval few years between tx  - CT scan 12/2024- POD - increased adrenal mass and mediastinal adenopathy.  MRI brain - [facility] - patient offered therapy - with immunotherapy - he declines to start now.  - CT scan 3/25- stable adenopathy, images reviewed with patient  - PETCT 6/25- due, patient didnt do yet   # Hypotension - patient on Monjaro - lost 13 kg, decline in BP while on Losartan.  Stop losartan, IV fluids, check cortisol  # Weight lost - reviewed with patient, considering tapering Monjaro

## 2025-06-19 NOTE — ASSESSMENT
[FreeTextEntry1] : 64-year-old  male presents today for initial consultation of abnormal MRI findings. Patient reports endocrinologist sent him for an US- which prompted an MRI. Patient underwent an MRI abdomen/ MRCP at [facility] on 7/8/24 results showed the following: Left adrenal gland demonstrates a 2.4 x 1.5cm nodule which demonstrates intracellular fat at inferior aspect and remainder of the nodule show no significant intracellular fat. The lesion is new since prior study. Therefore, metastasis cannot be ruled out. 3.1cm focal lesion at the right inrahilar region and 5.3 x 4cm lesion at subchondral region. Possibility of enlarged LN considered. There is also a 1.5cm density in the LLL. Further correlation with chest CT scan is rec.  Patient is known to the practice for a history of RCC in 2017- was following with Dr. Draper. S/p RIGHT radical nephrectomy 12/08/2017 for a renal cell carcinoma, clear type, 6.3 x 4.5 x 4.2 cm, occupying the midpole of the kidney. Nilson and WHO/ISUP Grade 1. Negative for lymphovascular or perineural invasion and confined within the renal capsule, even as it bulges out into the adjacent perinephric and sinus fat.  # Anemia, CKD EGFR 27  -PETCT Enlarged, hypermetabolic aortopulmonary, subcarinal, and inferior right hilar nodes, most likely representing metastatic disease. Intense focal uptake in the mid and upper sacrum, SUV max 9.3, possibly representing metastatic disease. Differential diagnosis includes malignant transformation of Paget's disease. Diffuse sclerotic changes of the right hemipelvis, most likely representing the patient's known Paget's disease.  Mildly FDG avid 2.5 cm left adrenal nodule, possibly representing metastatic disease. - biopsy 9/2024- c/w metastatic RCC - Reviewed with patient stage 4, options of treatment vs wait and watch.  Will repeat CT scan in 3 months. Patient hd interval few years between tx  - CT scan 12/2024- POD - increased adrenal mass and mediastinal adenopathy.  MRI brain - [facility] - patient offered therapy - with immunotherapy - he declines to start now.  - CT scan 3/25- stable adenopathy, images reviewed with patient  - PETCT 6/25- due, patient didnt do yet   # Hypotension - patient on Monjaro - lost 13 kg, decline in BP while on Losartan.  Stop losartan, IV fluids, check cortisol  # Weight lost - reviewed with patient, considering tapering Monjaro

## 2025-06-19 NOTE — HISTORY OF PRESENT ILLNESS
[Disease: _____________________] : Disease: [unfilled] [T: ___] : T[unfilled] [N: ___] : N[unfilled] [de-identified] : 64-year-old  male presents today for initial consultation of abnormal MRI findings.  Patient reports endocrinologist sent him for an US- which prompted an MRI.  Patient underwent an MRI abdomen/ MRCP at [facility] on 7/8/24 results showed the following: Left adrenal gland demonstrates a 2.4 x 1.5cm nodule which demonstrates intracellular fat at inferior aspect and remainder of the nodule show no significant intracellular fat.  The lesion is new since prior study.  Therefore, metastasis cannot be ruled out.  3.1cm focal lesion at the right inrahilar region and 5.3 x 4cm lesion at subchondral region.  Possibility of enlarged LN considered.  There is also a 1.5cm density in the LLL.  Further correlation with chest CT scan is rec.    Patient is known to the practice for a history of RCC in 2017- was following with Dr. Draper.  S/p RIGHT radical nephrectomy 12/08/2017 for a renal cell carcinoma, clear type, 6.3 x 4.5 x 4.2 cm, occupying the midpole of the kidney. Nilson and WHO/ISUP Grade 1. Negative for lymphovascular or perineural invasion and confined within the renal capsule, even as it bulges out into the adjacent perinephric and sinus fat. The tumor externally compresses but does no invade the renal pelvis and the calyces. Vascular, ureteral and soft tissue margins are all negative for tumor involvement. Benign adrenals (Tumor staging [id number] ). He was found on postoperative blood work to have persistent elevation of liver function tests (11/17 , ALT 73, 1/18  ALT 45) He was referred for MRI of abdomen which showed an abnormal bony matrix identified involving the right iliac bone extending to and involving the right acetabulum and also involving the right sacrum at the level f the right sacroiliac joint space - may represent Paget's disease. The bone scan prior to surgery showed involvement in same area.  He is s/p one dose of zometa to stabilize iliac bone and acetabulum.    He reports he has lost weight beginning 1 year ago, intentional.  Recently he started to have mid back pain- however he attributed that to exercising.  Denies cough or SOB.    Family Hx- Father with liver cancer (ETOH use) in his 60's  Social Hx- Former cigar use social occasions Denies ETOH or illicit drug use Retired [employer]- working PT [de-identified] : Patient presents today for follow up. Patient overall feels well with no new issues. He has a PET scan order but hasn't heard back from radiology yet to schedule.

## 2025-06-19 NOTE — REVIEW OF SYSTEMS
[Patient Intake Form Reviewed] : Patient intake form was reviewed [Fatigue] : fatigue [Diarrhea: Grade 0] : Diarrhea: Grade 0 [Negative] : Allergic/Immunologic [FreeTextEntry2] : improved